# Patient Record
Sex: FEMALE | Race: WHITE | NOT HISPANIC OR LATINO | Employment: FULL TIME | URBAN - NONMETROPOLITAN AREA
[De-identification: names, ages, dates, MRNs, and addresses within clinical notes are randomized per-mention and may not be internally consistent; named-entity substitution may affect disease eponyms.]

---
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
## 2020-10-14 ENCOUNTER — PROCEDURE VISIT (OUTPATIENT)
Dept: OBSTETRICS AND GYNECOLOGY | Facility: CLINIC | Age: 20
End: 2020-10-14

## 2020-10-14 VITALS
DIASTOLIC BLOOD PRESSURE: 70 MMHG | WEIGHT: 142.6 LBS | BODY MASS INDEX: 26.24 KG/M2 | SYSTOLIC BLOOD PRESSURE: 110 MMHG | HEIGHT: 62 IN

## 2020-10-14 DIAGNOSIS — Z01.419 ENCOUNTER FOR GYNECOLOGICAL EXAMINATION WITHOUT ABNORMAL FINDING: Primary | ICD-10-CM

## 2020-10-14 DIAGNOSIS — Z12.4 SCREENING FOR CERVICAL CANCER: ICD-10-CM

## 2020-10-14 DIAGNOSIS — Z11.3 SCREENING FOR STD (SEXUALLY TRANSMITTED DISEASE): ICD-10-CM

## 2020-10-14 PROCEDURE — 99395 PREV VISIT EST AGE 18-39: CPT | Performed by: PHYSICIAN ASSISTANT

## 2020-10-14 NOTE — PROGRESS NOTES
Subjective   Chief Complaint   Patient presents with   • Gynecologic Exam     Has never had a pap.  Patient states she can't feel her IUD strings       Zakiya Naranjo is a 20 y.o. year old  presenting to be seen for her first annual gynecological exam.   She has Kyleena IUD placed almost 3 years ago  Has regular monthly bleeding with IUD  She has not been able to feels IUD strings for a few months but could feel strings after first inserted  She would like STI screening with pap     Past Medical History:   Diagnosis Date   • Anemia    • Anxiety    • Asthma    • Clotting disorder ([facility])     acute ITP   • Depression    • Urinary tract infection         Current Outpatient Medications:   •  nortriptyline (PAMELOR) 10 MG capsule, TAKE 3 CAPSULES BY MOUTH AT BEDTIME AS DIRECTED, Disp: , Rfl: 9    Current Facility-Administered Medications:   •  levonorgestrel (KYLEENA) 19.5 MG IUD 1 each, 1 each, Intrauterine, Once, Barbara Mistry PA-C   Allergies   Allergen Reactions   • Penicillins Hives   • Sulfa Antibiotics Hives      Past Surgical History:   Procedure Laterality Date   • OTHER SURGICAL HISTORY      Tear duct, thumb      Social History     Socioeconomic History   • Marital status: Single     Spouse name: Not on file   • Number of children: Not on file   • Years of education: Not on file   • Highest education level: Not on file   Tobacco Use   • Smoking status: Never Smoker   • Smokeless tobacco: Never Used   Substance and Sexual Activity   • Alcohol use: No   • Drug use: No   • Sexual activity: Yes     Partners: Male     Birth control/protection: I.U.D.      Family History   Problem Relation Age of Onset   • Thyroid disease Mother    • Hyperlipidemia Paternal Grandmother    • Hypertension Paternal Grandmother    • Coronary artery disease Maternal Grandmother        Review of Systems   Constitutional: Negative for chills, diaphoresis and fever.   Gastrointestinal: Negative for abdominal pain, nausea and  "vomiting.   Genitourinary: Negative for difficulty urinating, dysuria, menstrual problem, vaginal bleeding and vaginal discharge.   Musculoskeletal: Negative.    All other systems reviewed and are negative.          Objective   /70   Ht 157.5 cm (62\")   Wt 64.7 kg (142 lb 9.6 oz)   LMP 10/11/2020   Breastfeeding No   BMI 26.08 kg/m²     Physical Exam  Constitutional:       Appearance: Normal appearance. She is well-developed, well-groomed and normal weight.   Eyes:      General: Lids are normal.      Extraocular Movements: Extraocular movements intact.      Conjunctiva/sclera: Conjunctivae normal.   Chest:      Breasts: Breasts are symmetrical.         Right: No inverted nipple, mass, nipple discharge, skin change or tenderness.         Left: No inverted nipple, mass, nipple discharge, skin change or tenderness.   Abdominal:      General: Abdomen is flat.      Palpations: Abdomen is soft.      Tenderness: There is no abdominal tenderness. There is no guarding.   Genitourinary:     Labia:         Right: No rash, tenderness or lesion.         Left: No rash, tenderness or lesion.       Urethra: No urethral pain, urethral swelling or urethral lesion.      Vagina: No vaginal discharge, erythema or tenderness.      Cervix: No cervical motion tenderness, discharge, friability, lesion or erythema.      Uterus: Not enlarged and not tender.       Adnexa:         Right: No mass or tenderness.          Left: No mass or tenderness.        Comments: IUD strings 2 cm  Pap done  Musculoskeletal: Normal range of motion.   Skin:     General: Skin is warm and dry.      Findings: No lesion or rash.   Neurological:      Mental Status: She is alert and oriented to person, place, and time.   Psychiatric:         Attention and Perception: Attention normal.         Mood and Affect: Mood normal.         Speech: Speech normal.         Behavior: Behavior is cooperative.         Thought Content: Thought content normal.            "   Assessment and Plan  Diagnoses and all orders for this visit:    1. Encounter for gynecological examination without abnormal finding (Primary)    2. Screening for cervical cancer  -     Liquid-based Pap Smear, Screening; Future    3. Screening for STD (sexually transmitted disease)      Patient Instructions   Encourage condoms always  Regular exercise             This note was electronically signed.    Barbara Mistry PA-C   October 14, 2020

## 2020-10-22 DIAGNOSIS — Z12.4 SCREENING FOR CERVICAL CANCER: ICD-10-CM

## 2020-10-29 DIAGNOSIS — Z12.4 SCREENING FOR CERVICAL CANCER: ICD-10-CM

## 2021-03-29 ENCOUNTER — IMMUNIZATION (OUTPATIENT)
Dept: VACCINE CLINIC | Facility: HOSPITAL | Age: 21
End: 2021-03-29

## 2021-03-29 PROCEDURE — 91300 HC SARSCOV02 VAC 30MCG/0.3ML IM: CPT | Performed by: INTERNAL MEDICINE

## 2021-03-29 PROCEDURE — 0001A: CPT | Performed by: INTERNAL MEDICINE

## 2021-04-19 ENCOUNTER — IMMUNIZATION (OUTPATIENT)
Dept: VACCINE CLINIC | Facility: HOSPITAL | Age: 21
End: 2021-04-19

## 2021-04-19 PROCEDURE — 0002A: CPT | Performed by: INTERNAL MEDICINE

## 2021-04-19 PROCEDURE — 91300 HC SARSCOV02 VAC 30MCG/0.3ML IM: CPT | Performed by: INTERNAL MEDICINE

## 2022-07-15 ENCOUNTER — OFFICE VISIT (OUTPATIENT)
Dept: OBSTETRICS AND GYNECOLOGY | Facility: CLINIC | Age: 22
End: 2022-07-15

## 2022-07-15 VITALS
WEIGHT: 160.4 LBS | HEIGHT: 62 IN | SYSTOLIC BLOOD PRESSURE: 120 MMHG | DIASTOLIC BLOOD PRESSURE: 70 MMHG | BODY MASS INDEX: 29.52 KG/M2

## 2022-07-15 DIAGNOSIS — Z30.432 ENCOUNTER FOR IUD REMOVAL: ICD-10-CM

## 2022-07-15 DIAGNOSIS — Z12.4 SCREENING FOR CERVICAL CANCER: ICD-10-CM

## 2022-07-15 DIAGNOSIS — Z01.419 WELL WOMAN EXAM WITH ROUTINE GYNECOLOGICAL EXAM: Primary | ICD-10-CM

## 2022-07-15 DIAGNOSIS — Z30.011 ENCOUNTER FOR INITIAL PRESCRIPTION OF CONTRACEPTIVE PILLS: ICD-10-CM

## 2022-07-15 PROCEDURE — 99395 PREV VISIT EST AGE 18-39: CPT | Performed by: PHYSICIAN ASSISTANT

## 2022-07-15 PROCEDURE — 58301 REMOVE INTRAUTERINE DEVICE: CPT | Performed by: PHYSICIAN ASSISTANT

## 2022-07-15 RX ORDER — DROSPIRENONE, ETHINYL ESTRADIOL AND LEVOMEFOLATE CALCIUM AND LEVOMEFOLATE CALCIUM 3-0.02(24)
1 KIT ORAL DAILY
Qty: 28 TABLET | Refills: 12 | Status: SHIPPED | OUTPATIENT
Start: 2022-07-15 | End: 2023-02-28 | Stop reason: SDUPTHER

## 2022-07-15 NOTE — PROGRESS NOTES
IUD Removal    Date of procedure:  7/15/2022    Risks and benefits discussed? yes  All questions answered? yes  Consents given by The patient  Written consent obtained? yes  Reason for removal: Device expiration      Procedure documentation:    A speculum was placed in order to view the cervix.  A tenaculum did not need to be placed on the anterior cervical lip.  Cervical dilation did not need to be performed in order to access the string.  The IUD string was easily seen.  The string was grasped and the IUD was removed without difficulty.  The IUD did not appear to be adherent to the uterine cavity. It was removed intact.    She tolerated the procedure without any difficulty.         Follow up  prn    This note was electronically signed.    Barbara Mistry PA-C  July 15, 2022

## 2022-07-15 NOTE — PATIENT INSTRUCTIONS
Self breast exam monthly  Regular exercise  May start ocp today  Take ocp consistently. Ocp not effective until completes 1 pack

## 2022-07-15 NOTE — PROGRESS NOTES
"Subjective   Chief Complaint   Patient presents with   • Gynecologic Exam     Last pap done 10/14/20-WNL, patient would like to have IUD removed today and go on oral contraceptives       Zakiya Naranjo is a 22 y.o. year old  presenting to be seen for her annual gynecological exam.   Has no complaints  Her IUD is due for removal and she wants removed and to start ocp. Wants ocp that might improve complexion  LMP 2022    Past Medical History:   Diagnosis Date   • Anemia    • Anxiety    • Asthma    • Clotting disorder (HCC)     acute ITP   • Depression    • Migraine    • PMS (premenstrual syndrome)    • Trauma    • Urinary tract infection         Current Outpatient Medications:   •  nortriptyline (PAMELOR) 10 MG capsule, TAKE 3 CAPSULES BY MOUTH AT BEDTIME AS DIRECTED, Disp: , Rfl: 9  No current facility-administered medications for this visit.   Allergies   Allergen Reactions   • Penicillins Hives   • Sulfa Antibiotics Hives      Past Surgical History:   Procedure Laterality Date   • OTHER SURGICAL HISTORY      Tear duct, thumb      Social History     Socioeconomic History   • Marital status: Single   Tobacco Use   • Smoking status: Never Smoker   • Smokeless tobacco: Never Used   Vaping Use   • Vaping Use: Never used   Substance and Sexual Activity   • Alcohol use: No   • Drug use: No   • Sexual activity: Not Currently     Partners: Male     Birth control/protection: I.U.D.      Family History   Problem Relation Age of Onset   • Thyroid disease Mother    • Prostate cancer Paternal Grandfather    • Hyperlipidemia Paternal Grandmother    • Hypertension Paternal Grandmother    • Coronary artery disease Maternal Grandmother        Review of Systems   Constitutional: Negative for chills, diaphoresis and fever.   Gastrointestinal: Negative.    Genitourinary: Negative for difficulty urinating, dysuria, menstrual problem, pelvic pain and vaginal discharge.           Objective   /70   Ht 157.5 cm (62\")   " Wt 72.8 kg (160 lb 6.4 oz)   LMP 06/20/2022 (Exact Date)   Breastfeeding No   BMI 29.34 kg/m²     Physical Exam  Constitutional:       Appearance: Normal appearance. She is well-developed and well-groomed.   Eyes:      General: Lids are normal.      Extraocular Movements: Extraocular movements intact.      Conjunctiva/sclera: Conjunctivae normal.   Neck:      Thyroid: No thyroid mass or thyromegaly.   Chest:   Breasts: Breasts are symmetrical.      Right: No inverted nipple, mass, nipple discharge, skin change, tenderness or axillary adenopathy.      Left: No inverted nipple, mass, nipple discharge, skin change, tenderness or axillary adenopathy.       Abdominal:      General: There is no distension.      Palpations: Abdomen is soft. There is no hepatomegaly or splenomegaly.      Tenderness: There is no abdominal tenderness.   Genitourinary:     Exam position: Lithotomy position.      Labia:         Right: No rash, tenderness or lesion.         Left: No rash, tenderness or lesion.       Urethra: No prolapse, urethral pain, urethral swelling or urethral lesion.      Vagina: No vaginal discharge, tenderness or lesions.      Cervix: No cervical motion tenderness, discharge, friability or lesion.      Uterus: Not enlarged and not tender.       Adnexa:         Right: No mass or tenderness.          Left: No mass or tenderness.     Musculoskeletal:      Cervical back: Neck supple.   Lymphadenopathy:      Upper Body:      Right upper body: No axillary adenopathy.      Left upper body: No axillary adenopathy.   Skin:     General: Skin is warm and dry.      Findings: No lesion.   Neurological:      General: No focal deficit present.      Mental Status: She is alert and oriented to person, place, and time.   Psychiatric:         Attention and Perception: Attention normal.         Mood and Affect: Mood normal.         Speech: Speech normal.         Behavior: Behavior normal.         Thought Content: Thought content normal.             Result Review :                   Assessment and Plan  Diagnoses and all orders for this visit:    1. Well woman exam with routine gynecological exam (Primary)    2. Screening for cervical cancer    3. Encounter for IUD removal    4. Encounter for initial prescription of contraceptive pills      Patient Instructions   Self breast exam monthly  Regular exercise  May start ocp today  Take ocp consistently. Ocp not effective until completes 1 pack             This note was electronically signed.    Barbara Mistry PA-C   July 15, 2022

## 2022-07-20 LAB — REF LAB TEST METHOD: NORMAL

## 2023-02-28 RX ORDER — DROSPIRENONE, ETHINYL ESTRADIOL AND LEVOMEFOLATE CALCIUM AND LEVOMEFOLATE CALCIUM 3-0.02(24)
1 KIT ORAL DAILY
Qty: 28 TABLET | Refills: 4 | Status: SHIPPED | OUTPATIENT
Start: 2023-02-28 | End: 2024-02-28

## 2023-02-28 NOTE — TELEPHONE ENCOUNTER
PROVIDER: BREE JI    Caller: Zakiya Naranjo    Relationship: Self    Best call back number: [phone]    Requested Prescriptions:   Requested Prescriptions     Pending Prescriptions Disp Refills   • Drospiren-Eth Estrad-Levomefol (Beyaz) 3-0.02-0.451 MG tablet 28 tablet 12     Sig: Take 1 tablet by mouth Daily.        Pharmacy where request should be sent:  [facility] @ [address] [place] [address]#[phone]    Additional details provided by patient: HAS 1 MTH LEFT    Does the patient have less than a 3 day supply:  [] Yes  [x] No    Would you like a call back once the refill request has been completed: [x] Yes [] No    If the office needs to give you a call back, can they leave a voicemail: [x] Yes [] No    Puja Richmond Rep   02/28/23 14:21 EST

## 2023-06-09 RX ORDER — DROSPIRENONE, ETHINYL ESTRADIOL AND LEVOMEFOLATE CALCIUM AND LEVOMEFOLATE CALCIUM 3-0.02(24)
1 KIT ORAL DAILY
Qty: 28 TABLET | Refills: 2 | Status: SHIPPED | OUTPATIENT
Start: 2023-06-09 | End: 2024-06-08